# Patient Record
Sex: FEMALE | URBAN - NONMETROPOLITAN AREA
[De-identification: names, ages, dates, MRNs, and addresses within clinical notes are randomized per-mention and may not be internally consistent; named-entity substitution may affect disease eponyms.]

---

## 2020-09-16 ENCOUNTER — OFFICE VISIT (OUTPATIENT)
Dept: URBAN - NONMETROPOLITAN AREA CLINIC 13 | Facility: CLINIC | Age: 30
End: 2020-09-16
Payer: COMMERCIAL

## 2020-09-16 DIAGNOSIS — R10.9 ABDOMINAL PAIN: ICD-10-CM

## 2020-09-16 DIAGNOSIS — K59.01 CONSTIPATION: ICD-10-CM

## 2020-09-16 DIAGNOSIS — R11.2 NAUSEA & VOMITING: ICD-10-CM

## 2020-09-16 PROCEDURE — 99204 OFFICE O/P NEW MOD 45 MIN: CPT | Performed by: INTERNAL MEDICINE

## 2020-09-16 PROCEDURE — 1036F TOBACCO NON-USER: CPT | Performed by: INTERNAL MEDICINE

## 2020-09-16 PROCEDURE — G8417 CALC BMI ABV UP PARAM F/U: HCPCS | Performed by: INTERNAL MEDICINE

## 2020-09-16 PROCEDURE — G8427 DOCREV CUR MEDS BY ELIG CLIN: HCPCS | Performed by: INTERNAL MEDICINE

## 2020-09-16 NOTE — PHYSICAL EXAM GASTROINTESTINAL
Abdomen , soft, minimal tenderness across the lower abdomen, non-tender in the right upper quadrant, nondistended , no guarding or rigidity , no masses palpable , normal bowel sounds , Liver and Spleen , no hepatomegaly present , no hepatosplenomegaly , liver nontender , spleen not palpable

## 2020-09-16 NOTE — HPI-OTHER HISTORIES
Ms. Shanti Sellers is a 30 year old female with past medical history of developmental delay (previously tested with genetics but genetic testing did not reveal a particular syndrome), who presents for evaluation of right upper quadrant/lower abdominal cramping.   Ms. Sellers is accompanied today by her mother who provides parts of the history.   She noted the development of acute onset abdominal pain within the past week which initially was described as right upper quadrant abdominal pain. She was evaluated by her primary care physician with recommendation to obtain an abdominal ultrasound. An abdominal ultrasound was performed on 9/9/2020 which showed "no shadowing echogenic gallstones. No acute sonographic abnormality." She did experience constipation over the past week and noted not having bowel movements for >3 days, with associated nausea/vomiting. She did experience liquid bowel movements which led to improvement of her abdominal discomfort. She notes the abdominal discomfort is improved from last week with the discomfort moving from the right upper quadrant to the lower abdomen.   Ms. Sellers did experience abdominal pain in 2014 at which point she was evaluated with colonoscopy including intubation of the temrinal ileum which was normal.   She also experienced abdominal discomfort in May 2020 at which point she underwent a CT Abdomen and Pelvis (results not currently available on the electronic medical record).   She denies fevers or chills.   2/23/2014: Colonoscopy Colonic mucosa was unremarkable. No ulcer, polyp, or mass was seen. There was no evidence of inflammatory bowel disease. No vascular abnormalities were noted. No diverticular openings were noted. Terminal ileum was entered and noted to be normal.   9/9/2020: Abdominal Ultrasound IMPRESSION: No shadowing echogenic gallstones. No acute sonographic abnormality.

## 2020-09-18 LAB
A/G RATIO: 1.7
ALBUMIN: 5
ALKALINE PHOSPHATASE: 86
ALT (SGPT): 31
AST (SGOT): 28
BASO (ABSOLUTE): 0.1
BASOS: 0
BILIRUBIN, TOTAL: <0.2
BUN/CREATININE RATIO: 22
BUN: 14
C-REACTIVE PROTEIN, QUANT: 5
CALCIUM: 10
CARBON DIOXIDE, TOTAL: 20
CHLORIDE: 104
CREATININE: 0.64
EGFR IF AFRICN AM: 140
EGFR IF NONAFRICN AM: 122
ENDOMYSIAL ANTIBODY IGA: NEGATIVE
EOS (ABSOLUTE): 0.1
EOS: 1
GLOBULIN, TOTAL: 3
GLUCOSE: 103
HEMATOCRIT: 47
HEMATOLOGY COMMENTS:: (no result)
HEMOGLOBIN: 16.1
IMMATURE CELLS: (no result)
IMMATURE GRANS (ABS): 0
IMMATURE GRANULOCYTES: 0
IMMUNOGLOBULIN A, QN, SERUM: 167
LYMPHS (ABSOLUTE): 2.8
LYMPHS: 24
MCH: 29.8
MCHC: 34.3
MCV: 87
MONOCYTES(ABSOLUTE): 0.7
MONOCYTES: 6
NEUTROPHILS (ABSOLUTE): 8.2
NEUTROPHILS: 69
NRBC: (no result)
PLATELETS: 353
POTASSIUM: 4.4
PROTEIN, TOTAL: 8
RBC: 5.41
RDW: 12.4
SEDIMENTATION RATE-WESTERGREN: 34
SODIUM: 144
T-TRANSGLUTAMINASE (TTG) IGA: <2
TSH: 2.42
WBC: 11.8

## 2020-09-21 ENCOUNTER — TELEPHONE ENCOUNTER (OUTPATIENT)
Dept: URBAN - NONMETROPOLITAN AREA CLINIC 2 | Facility: CLINIC | Age: 30
End: 2020-09-21

## 2020-09-23 ENCOUNTER — LAB OUTSIDE AN ENCOUNTER (OUTPATIENT)
Dept: URBAN - NONMETROPOLITAN AREA CLINIC 2 | Facility: CLINIC | Age: 30
End: 2020-09-23

## 2020-09-26 LAB
A/G RATIO: 1.4
ALBUMIN: 4.8
ALKALINE PHOSPHATASE: 97
ALT (SGPT): 46
AST (SGOT): 36
BASO (ABSOLUTE): 0
BASOS: 0
BILIRUBIN, TOTAL: 0.4
BUN/CREATININE RATIO: 17
BUN: 10
C-REACTIVE PROTEIN, QUANT: 8
CALCIUM: 10
CARBON DIOXIDE, TOTAL: 20
CHLORIDE: 100
CREATININE: 0.58
EGFR IF AFRICN AM: 145
EGFR IF NONAFRICN AM: 126
ENDOMYSIAL ANTIBODY IGA: NEGATIVE
EOS (ABSOLUTE): 0
EOS: 0
GLOBULIN, TOTAL: 3.4
GLUCOSE: 75
HEMATOCRIT: 49
HEMATOLOGY COMMENTS:: (no result)
HEMOGLOBIN: 16.2
IMMATURE CELLS: (no result)
IMMATURE GRANS (ABS): 0
IMMATURE GRANULOCYTES: 0
IMMUNOGLOBULIN A, QN, SERUM: 181
LYMPHS (ABSOLUTE): 2.3
LYMPHS: 20
MCH: 29.3
MCHC: 33.1
MCV: 89
MONOCYTES(ABSOLUTE): 0.7
MONOCYTES: 7
NEUTROPHILS (ABSOLUTE): 8.1
NEUTROPHILS: 73
NRBC: (no result)
PLATELETS: 327
POTASSIUM: 4.3
PROTEIN, TOTAL: 8.2
RBC: 5.53
RDW: 12.7
SEDIMENTATION RATE-WESTERGREN: 38
SODIUM: 138
T-TRANSGLUTAMINASE (TTG) IGA: <2
TSH: 2.1
WBC: 11.2

## 2020-09-29 ENCOUNTER — TELEPHONE ENCOUNTER (OUTPATIENT)
Dept: URBAN - METROPOLITAN AREA CLINIC 92 | Facility: CLINIC | Age: 30
End: 2020-09-29

## 2020-10-14 ENCOUNTER — OFFICE VISIT (OUTPATIENT)
Dept: URBAN - NONMETROPOLITAN AREA CLINIC 13 | Facility: CLINIC | Age: 30
End: 2020-10-14
Payer: COMMERCIAL

## 2020-10-14 DIAGNOSIS — R10.9 ABDOMINAL PAIN: ICD-10-CM

## 2020-10-14 DIAGNOSIS — R93.5 ABNORMAL ABDOMINAL CT SCAN: ICD-10-CM

## 2020-10-14 DIAGNOSIS — N92.6 MENSTRUAL CYCLE PROBLEM: ICD-10-CM

## 2020-10-14 DIAGNOSIS — K59.01 CONSTIPATION: ICD-10-CM

## 2020-10-14 PROBLEM — 443371000124107 BODY MASS INDEX 30.00 TO 34.99: Status: ACTIVE | Noted: 2020-09-19

## 2020-10-14 PROBLEM — 16932000 NAUSEA AND VOMITING: Status: ACTIVE | Noted: 2020-09-19

## 2020-10-14 PROCEDURE — G8417 CALC BMI ABV UP PARAM F/U: HCPCS | Performed by: INTERNAL MEDICINE

## 2020-10-14 PROCEDURE — 99214 OFFICE O/P EST MOD 30 MIN: CPT | Performed by: INTERNAL MEDICINE

## 2020-10-14 PROCEDURE — G8427 DOCREV CUR MEDS BY ELIG CLIN: HCPCS | Performed by: INTERNAL MEDICINE

## 2020-10-14 PROCEDURE — 1036F TOBACCO NON-USER: CPT | Performed by: INTERNAL MEDICINE

## 2020-10-14 NOTE — HPI-OTHER HISTORIES
9/16/2020: Initial Gastroenterology Visit   Ms. Shanti Sellers is a 30 year old female with past medical history of developmental delay (previously tested with genetics but genetic testing did not reveal a particular syndrome), who presents for evaluation of right upper quadrant/lower abdominal cramping.   Ms. Sellers is accompanied today by her mother who provides parts of the history.   She noted the development of acute onset abdominal pain within the past week which initially was described as right upper quadrant abdominal pain. She was evaluated by her primary care physician with recommendation to obtain an abdominal ultrasound. An abdominal ultrasound was performed on 9/9/2020 which showed "no shadowing echogenic gallstones. No acute sonographic abnormality." She did experience constipation over the past week and noted not having bowel movements for >3 days, with associated nausea/vomiting. She did experience liquid bowel movements which led to improvement of her abdominal discomfort. She notes the abdominal discomfort is improved from last week with the discomfort moving from the right upper quadrant to the lower abdomen.   Ms. Sellers did experience abdominal pain in 2014 at which point she was evaluated with colonoscopy including intubation of the temrinal ileum which was normal.   She also experienced abdominal discomfort in May 2020 at which point she underwent a CT Abdomen and Pelvis which showed "no acute obstructive or inflammatory process identified"   She denies fevers or chills.   Prior Evaluation:  2/23/2014: Colonoscopy Colonic mucosa was unremarkable. No ulcer, polyp, or mass was seen. There was no evidence of inflammatory bowel disease. No vascular abnormalities were noted. No diverticular openings were noted. Terminal ileum was entered and noted to be normal.   9/9/2020: Abdominal Ultrasound IMPRESSION: No shadowing echogenic gallstones. No acute sonographic abnormality.  5/14/2020: CT Abdomen and Pelvis with Contrast Impression: No acute obstructive of inflammatory process identified. Normal appendix.   Interval Events:  9/16/2020: WBC 11.8, hemoglobin 16.1, hematocrit 47.0, platelets 353. Chemsitry panle/LFTs normal. TSH normal at 2.420. Celiac serologies negative. CRP normal at 5, ESR mildlyl elevated at 34.  9/29/2020: Seen at [facility] given abdominal pain in the setting of a menstrual cycle.  9/29/2020: CT Abdomen and Pelvis with Contrast IMPRESSION: 1. Endometrial thickening. This is not well evaluated by CT. 2. The appendix is normal.  9/29/2020: CBC normal, chemistry panel normal, LFTs normal.   10/14/2020: Gastroenterology Follow-Up Appointment At today's visit, Ms. Sellers acknoweldges that her abdominal discomfort is improving. She has improvement of the lower abdominal cramping after passage of bowel movements. She is currently on polyethylene glycol with one bowel movement every other day. Her mother acknowedges that her daughter has had her second menstrual cycle this year. She had not experienced a menstrual cycle for years.

## 2020-10-16 ENCOUNTER — TELEPHONE ENCOUNTER (OUTPATIENT)
Dept: URBAN - METROPOLITAN AREA CLINIC 92 | Facility: CLINIC | Age: 30
End: 2020-10-16

## 2020-10-16 ENCOUNTER — DASHBOARD ENCOUNTERS (OUTPATIENT)
Age: 30
End: 2020-10-16

## 2020-10-16 PROBLEM — 14760008 CONSTIPATION: Status: ACTIVE | Noted: 2020-09-19

## 2020-10-16 PROBLEM — 15634181000119107 CT OF ABDOMEN ABNORMAL: Status: ACTIVE | Noted: 2020-10-16

## 2020-10-16 PROBLEM — 21522001 ABDOMINAL PAIN: Status: ACTIVE | Noted: 2020-09-16

## 2020-10-16 PROBLEM — 80182007 IRREGULAR MENSTRUATION: Status: ACTIVE | Noted: 2020-10-14

## 2020-10-16 PROBLEM — 162864005 BODY MASS INDEX 30+ - OBESITY: Status: ACTIVE | Noted: 2020-10-16

## 2021-02-16 ENCOUNTER — OFFICE VISIT (OUTPATIENT)
Dept: URBAN - NONMETROPOLITAN AREA CLINIC 2 | Facility: CLINIC | Age: 31
End: 2021-02-16